# Patient Record
Sex: MALE | Race: AMERICAN INDIAN OR ALASKA NATIVE | NOT HISPANIC OR LATINO | Employment: FULL TIME | URBAN - METROPOLITAN AREA
[De-identification: names, ages, dates, MRNs, and addresses within clinical notes are randomized per-mention and may not be internally consistent; named-entity substitution may affect disease eponyms.]

---

## 2023-06-08 ENCOUNTER — HOSPITAL ENCOUNTER (EMERGENCY)
Facility: HOSPITAL | Age: 23
Discharge: HOME | End: 2023-06-09
Attending: EMERGENCY MEDICINE | Admitting: EMERGENCY MEDICINE
Payer: MEDICAID

## 2023-06-08 ENCOUNTER — APPOINTMENT (OUTPATIENT)
Dept: RADIOLOGY | Facility: HOSPITAL | Age: 23
End: 2023-06-08
Attending: EMERGENCY MEDICINE
Payer: MEDICAID

## 2023-06-08 DIAGNOSIS — S62.635A CLOSED DISPLACED FRACTURE OF DISTAL PHALANX OF LEFT RING FINGER, INITIAL ENCOUNTER: ICD-10-CM

## 2023-06-08 PROCEDURE — 99284 EMERGENCY DEPT VISIT MOD MDM: CPT | Mod: 25

## 2023-06-08 PROCEDURE — 73130 X-RAY EXAM OF HAND: CPT | Mod: LT

## 2023-06-08 PROCEDURE — 26750 TREAT FINGER FRACTURE EACH: CPT | Mod: F3

## 2023-06-08 ASSESSMENT — ACTIVITIES OF DAILY LIVING (ADL): ADLS_ACUITY_SCORE: 33

## 2023-06-09 ENCOUNTER — TELEPHONE (OUTPATIENT)
Dept: ORTHOPEDICS | Facility: CLINIC | Age: 23
End: 2023-06-09

## 2023-06-09 ENCOUNTER — PATIENT OUTREACH (OUTPATIENT)
Dept: CARE COORDINATION | Facility: CLINIC | Age: 23
End: 2023-06-09

## 2023-06-09 VITALS
OXYGEN SATURATION: 97 % | HEIGHT: 71 IN | TEMPERATURE: 99.8 F | SYSTOLIC BLOOD PRESSURE: 131 MMHG | RESPIRATION RATE: 16 BRPM | HEART RATE: 88 BPM | BODY MASS INDEX: 28 KG/M2 | WEIGHT: 200 LBS | DIASTOLIC BLOOD PRESSURE: 80 MMHG

## 2023-06-09 ASSESSMENT — ACTIVITIES OF DAILY LIVING (ADL): ADLS_ACUITY_SCORE: 35
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
## 2023-06-09 NOTE — TELEPHONE ENCOUNTER
I called the patient to offer them an appointment with Dr. Lin Tuesday 06/13/2023 at 8:10am or Dr. Daigle Wednesday 06/14/2023 at 9:00am (okay to double book). I left a message asking them to call us back to get scheduled.    Valeriano Garrett, EMT

## 2023-06-09 NOTE — PROGRESS NOTES
Clinic Care Coordination Contact    Follow Up Progress Note      Assessment: The pt was recently in the ED, I called to check up on the pt and help the pt setup a ED follow up. The pt was at [facility] for finger injury. I called and talked to the pt, it seems like pt wants to establish care, but will call to schedule later.     Care Gaps:    Health Maintenance Due   Topic Date Due     YEARLY PREVENTIVE VISIT  Never done     ADVANCE CARE PLANNING  Never done     HIV SCREENING  Never done     HEPATITIS C SCREENING  Never done     HPV IMMUNIZATION (2 - Male 3-dose series) 01/13/2020     COVID-19 Vaccine (3 - Pfizer series) 03/07/2022     PHQ-2 (once per calendar year)  Never done           Care Plans      Intervention/Education provided during outreach:               Plan:     Care Coordinator will follow up in

## 2023-06-09 NOTE — TELEPHONE ENCOUNTER
Orthopedic/Sports Medicine Fracture Triage    Incoming call/or message from cold call to back line.    Fracture type: Finger.    The patient is in a  splint.    Date of injury 06/09/2023.    Triaged by: Dr. Anderson.    Determined to be managed Surgically.    Needs to be seen ASAP.    Additional Comments/information: Dr. Anderson suggested that they be seen early next week.    Valeriano Garrett, EMT

## 2023-06-09 NOTE — TELEPHONE ENCOUNTER
Patient cold called direct clinic line stating that they needed to be seen for a finger fracture. I let them know that I would show their xrays to a doctor who will determine if they need to see a surgeon and we will call them back once we know how to schedule them.    Valeriano Garrett, EMT
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
## 2023-06-12 DIAGNOSIS — M79.645 FINGER PAIN, LEFT: Primary | ICD-10-CM

## 2023-06-13 ENCOUNTER — OFFICE VISIT (OUTPATIENT)
Dept: ORTHOPEDICS | Facility: CLINIC | Age: 23
End: 2023-06-13
Payer: MEDICAID

## 2023-06-13 ENCOUNTER — PRE VISIT (OUTPATIENT)
Dept: ORTHOPEDICS | Facility: CLINIC | Age: 23
End: 2023-06-13

## 2023-06-13 ENCOUNTER — ANCILLARY PROCEDURE (OUTPATIENT)
Dept: GENERAL RADIOLOGY | Facility: CLINIC | Age: 23
End: 2023-06-13
Attending: STUDENT IN AN ORGANIZED HEALTH CARE EDUCATION/TRAINING PROGRAM
Payer: MEDICAID

## 2023-06-13 DIAGNOSIS — M79.645 FINGER PAIN, LEFT: ICD-10-CM

## 2023-06-13 DIAGNOSIS — S62.635A CLOSED DISPLACED FRACTURE OF DISTAL PHALANX OF LEFT RING FINGER, INITIAL ENCOUNTER: ICD-10-CM

## 2023-06-13 PROCEDURE — 99204 OFFICE O/P NEW MOD 45 MIN: CPT | Performed by: STUDENT IN AN ORGANIZED HEALTH CARE EDUCATION/TRAINING PROGRAM

## 2023-06-13 PROCEDURE — 73140 X-RAY EXAM OF FINGER(S): CPT | Mod: LT | Performed by: RADIOLOGY

## 2023-06-13 ASSESSMENT — ENCOUNTER SYMPTOMS
MYALGIAS: 1
ARTHRALGIAS: 1
NECK PAIN: 0
MUSCLE CRAMPS: 0
BACK PAIN: 0
MUSCLE WEAKNESS: 0
STIFFNESS: 0
JOINT SWELLING: 1
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
## 2023-06-13 NOTE — NURSING NOTE
Reason For Visit:   Chief Complaint   Patient presents with     Consult     closed displaced fracture of dsital phalanx of L ring finger/Dr. Meléndez       Primary MD: Arianna Durbin  Ref. MD: Dr. Meléndez     Age: 22 year old    ?  No      There were no vitals taken for this visit.      Pain Assessment  Patient Currently in Pain: Yes  Patient's Stated Pain Goal: 4    Hand Dominance Evaluation  Hand Dominance: Ambidextrous          QuickDASH Assessment       View : No data to display.                   No current outpatient medications on file.       No Known Allergies    Ml Chakraborty
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
## 2023-07-06 DIAGNOSIS — M79.645 FINGER PAIN, LEFT: Primary | ICD-10-CM

## 2023-07-11 ENCOUNTER — OFFICE VISIT (OUTPATIENT)
Dept: ORTHOPEDICS | Facility: CLINIC | Age: 23
End: 2023-07-11
Payer: COMMERCIAL

## 2023-07-11 ENCOUNTER — ANCILLARY PROCEDURE (OUTPATIENT)
Dept: GENERAL RADIOLOGY | Facility: CLINIC | Age: 23
End: 2023-07-11
Attending: STUDENT IN AN ORGANIZED HEALTH CARE EDUCATION/TRAINING PROGRAM
Payer: COMMERCIAL

## 2023-07-11 DIAGNOSIS — M79.645 FINGER PAIN, LEFT: ICD-10-CM

## 2023-07-11 DIAGNOSIS — S62.635A CLOSED DISPLACED FRACTURE OF DISTAL PHALANX OF LEFT RING FINGER, INITIAL ENCOUNTER: Primary | ICD-10-CM

## 2023-07-11 PROCEDURE — 99213 OFFICE O/P EST LOW 20 MIN: CPT | Performed by: STUDENT IN AN ORGANIZED HEALTH CARE EDUCATION/TRAINING PROGRAM

## 2023-07-11 PROCEDURE — 73140 X-RAY EXAM OF FINGER(S): CPT | Mod: LT | Performed by: RADIOLOGY

## 2023-07-11 RX ORDER — BUPROPION HYDROCHLORIDE 150 MG/1
TABLET ORAL
COMMUNITY
Start: 2021-12-15

## 2023-07-11 RX ORDER — TRAZODONE HYDROCHLORIDE 50 MG/1
TABLET, FILM COATED ORAL
COMMUNITY
Start: 2023-04-04

## 2023-07-11 RX ORDER — GABAPENTIN 300 MG/1
CAPSULE ORAL
COMMUNITY
Start: 2023-03-07
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
## 2023-07-11 NOTE — PROGRESS NOTES
Ortho Hand    Doing well.  Wearing splint most of the time, has taken it off and tried to bend the joint.    On exam, mild tenderness over the left ring finger distal phalanx.    XR: Stable alignment of left ring finger mallet fracture    A/P: 22-year-old male ambidextrous smoker presenting with left ring finger DIP closed mallet fracture, now 4 weeks after initiation of mallet splinting    -Since patient has some tenderness and is only at 4 weeks after initiation of nonoperative management, my recommendation is an additional 2 weeks of splinting at all times.  Explained and reiterated the importance of not bending the DIP joint and to wear the splint at all times.  When removing for showering or handwashing, patient should keep the DIP joint extended.  Encouraged patient to quit smoking.  Turn to clinic in 2 weeks for reevaluation with XR.  -A total of 20 minutes was devoted to review of chart, direct face-to-face patient counseling and documentation during this encounter, exclusive of any procedure performed.    Devonte Lin MD, PhD
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
## 2023-07-11 NOTE — NURSING NOTE
Reason For Visit:   Chief Complaint   Patient presents with     RECHECK     4 wk followup left ring finger fracture DOI 6/8/23       Primary MD: Mayra Nguyen      Age: 22 year old    Right hand dominant    ?  No      There were no vitals taken for this visit.      Pain Assessment  Patient Currently in Pain: Yes  0-10 Pain Scale: 3  Primary Pain Location: Finger (Comment which one) (left ring)               QuickDASH Assessment      7/11/2023     9:01 AM   QuickDASH Main   1. Open a tight or new jar Severe difficulty   2. Do heavy household chores (e.g., wash walls, floors) Mild difficulty   3. Carry a shopping bag or briefcase No difficulty   4. Wash your back No difficulty   5. Use a knife to cut food Mild difficulty   6. Recreational activities in which you take some force or impact through your arm, shoulder or hand (e.g., golf, hammering, tennis, etc.) Moderate difficulty   7. During the past week, to what extent has your arm, shoulder or hand problem interfered with your normal social activities with family, friends, neighbours or groups Slightly   8. During the past week, were you limited in your work or other regular daily activities as a result of your arm, shoulder or hand problem Slightly limited   9. Arm, shoulder or hand pain Moderate   10.Tingling (pins and needles) in your arm,shoulder or hand None   11. During the past week, how much difficulty have you had sleeping because of the pain in your arm, shoulder or hand No difficulty   Quickdash Ability Score 25          Current Outpatient Medications   Medication Sig Dispense Refill     buPROPion (WELLBUTRIN XL) 150 MG 24 hr tablet        gabapentin (NEURONTIN) 300 MG capsule        traZODone (DESYREL) 50 MG tablet          No Known Allergies    Miles Ariana, ATC

## 2023-07-14 DIAGNOSIS — M79.645 FINGER PAIN, LEFT: Primary | ICD-10-CM

## 2023-12-14 ENCOUNTER — HOSPITAL ENCOUNTER (EMERGENCY)
Facility: HOSPITAL | Age: 23
Discharge: HOME | End: 2023-12-15
Attending: EMERGENCY MEDICINE | Admitting: EMERGENCY MEDICINE
Payer: MEDICAID

## 2023-12-14 VITALS
TEMPERATURE: 98.8 F | SYSTOLIC BLOOD PRESSURE: 138 MMHG | RESPIRATION RATE: 16 BRPM | DIASTOLIC BLOOD PRESSURE: 93 MMHG | HEART RATE: 69 BPM | OXYGEN SATURATION: 100 %

## 2023-12-14 DIAGNOSIS — H10.31 ACUTE CONJUNCTIVITIS OF RIGHT EYE, UNSPECIFIED ACUTE CONJUNCTIVITIS TYPE: ICD-10-CM

## 2023-12-14 PROCEDURE — 99283 EMERGENCY DEPT VISIT LOW MDM: CPT

## 2023-12-15 ENCOUNTER — PATIENT OUTREACH (OUTPATIENT)
Dept: CARE COORDINATION | Facility: CLINIC | Age: 23
End: 2023-12-15
Payer: MEDICAID

## 2023-12-15 PROBLEM — F32.A DEPRESSION: Status: ACTIVE | Noted: 2021-12-15

## 2023-12-15 PROBLEM — F15.11 METHAMPHETAMINE USE DISORDER, MILD, IN EARLY REMISSION (H): Status: ACTIVE | Noted: 2021-12-15

## 2023-12-15 RX ORDER — POLYMYXIN B SULFATE AND TRIMETHOPRIM 1; 10000 MG/ML; [USP'U]/ML
2 SOLUTION OPHTHALMIC EVERY 6 HOURS
Qty: 10 ML | Refills: 0 | Status: SHIPPED | OUTPATIENT
Start: 2023-12-15 | End: 2023-12-22

## 2023-12-15 NOTE — PROGRESS NOTES
Clinic Care Coordination Contact  Follow Up Progress Note      Assessment:  The pt was recently in the ED, I called to check up on the pt and help the pt setup a ED follow up. The pt was at [facility] for eye drainage. I called the pt, but got his vm, so I left a vm for the pt to give me a call back.     Care Gaps:    Health Maintenance Due   Topic Date Due    YEARLY PREVENTIVE VISIT  Never done    ADVANCE CARE PLANNING  Never done    HIV SCREENING  Never done    HEPATITIS C SCREENING  Never done    HPV IMMUNIZATION (2 - Male 3-dose series) 01/13/2020    INFLUENZA VACCINE (1) 09/01/2023    COVID-19 Vaccine (3 - 2023-24 season) 09/01/2023           Care Plans      Intervention/Education provided during outreach:               Plan:     Care Coordinator will follow up in

## 2023-12-15 NOTE — ED TRIAGE NOTES
Pt states right eye redness and drainage for the past 2 days.  Pt states pain is getting progressively worse.

## 2023-12-15 NOTE — ED PROVIDER NOTES
EMERGENCY DEPARTMENT ENCOUNTER      NAME: Timothy Schaffer  AGE: 23 year old male  YOB: 2000  MRN: [id number]  EVALUATION DATE & TIME: No admission date for patient encounter.    PCP: Mayra Nguyen    ED PROVIDER: Jus Reynaga M.D.      Chief Complaint   Patient presents with    Eye Drainage         FINAL IMPRESSION:  1. Acute conjunctivitis of right eye, unspecified acute conjunctivitis type          ED COURSE & MEDICAL DECISION MAKING:    Pertinent Labs & Imaging studies reviewed below.  All EKGs below represent my independent interpretation.      Patient is a otherwise healthy 23-year-old who presents with few days of right eye redness, mild discomfort.  No initial injury.  On exam here he is no pain with extraocular motion, and I do not suspect he has corneal abrasion, but is gross examination is consistent with conjunctivitis.  Given progression of symptoms for the last few days a think we should treat for possible bacterial origin and he was sent home with Polytrim drops.  We discussed return precautions.    Additional ED Course Timestamps:  1:05 AM I met with the patient, obtained history, performed an initial exam, and discussed options and plan for diagnostics and treatment here in the ED.     Medical Decision Making    History:  Supplemental history from: Documented in chart, if applicable  External Record(s) reviewed: Documented in chart, if applicable.    Work Up:  Chart documentation includes differential considered and any EKGs or imaging independently interpreted by provider, where specified.  In additional to work up documented, I considered the following work up: Documented in chart, if applicable.    External consultation:  Discussion of management with another provider: Documented in chart, if applicable    Complicating factors:  Care impacted by chronic illness: N/A  Care affected by social determinants of health: N/A    Disposition considerations: Discharge. No recommendations  "on prescription strength medication(s). N/A.    At the conclusion of the encounter I discussed the results of all of the tests and the disposition. The questions were answered. The patient or family acknowledged understanding and was agreeable with the care plan.       MEDICATIONS GIVEN IN THE EMERGENCY:  Medications - No data to display      NEW PRESCRIPTIONS STARTED AT TODAY'S ER VISIT  Discharge Medication List as of 12/15/2023  1:04 AM        START taking these medications    Details   polymixin b-trimethoprim (POLYTRIM) 10728-2.1 UNIT/ML-% ophthalmic solution Place 2 drops into the right eye every 6 hours for 7 days, Disp-10 mL, R-0, E-Prescribe                =================================================================    HPI    Patient information was obtained from: patient     Use of : N/A         Timothy Schaffer is a 23 year old male with a pertinent history of depression, alcohol use, and methamphetamine use who presents to this ED for evaluation of eye drainage.    The patient presents with a few days of a redness, itchiness, and draining in his right eye. It currently feels \"scratchy\" and more blurry than his left eye. He does not wear contacts or glasses. He says his girlfriends daughter may have conjunctivitis. He denies any pain in his eye. Denies any other complaints at this time.          VITALS:  BP (!) 138/93   Pulse 69   Temp 98.8  F (37.1  C) (Temporal)   Resp 16   SpO2 100%     PHYSICAL EXAM    .og      PROCEDURES:   None      I, Jonny Yang am serving as a scribe to document services personally performed by Dr. Jus Reynaga based on my observation and the provider's statements to me. IJus MD attest that Jonny Yang is acting in a scribe capacity, has observed my performance of the services and has documented them in accordance with my direction.    Jus Reynaga M.D.  Emergency Medicine  [facility]" [facility] EMERGENCY DEPARTMENT  [address] [place] [address]  [phone]  Dept: [phone]       Jus Reynaga MD  12/15/23 0548

## 2023-12-15 NOTE — DISCHARGE INSTRUCTIONS
Please practice good hand hygiene, conjunctivitis can be very contagious.    If your pain, drainage, is getting worse on Saturday and Sunday: this is a sign that your infection is not being adequately treated and I recommend you check in at an urgent care or back to the emergency department for reevaluation.    Otherwise please complete the drops.

## 2023-12-18 ENCOUNTER — PATIENT OUTREACH (OUTPATIENT)
Dept: CARE COORDINATION | Facility: CLINIC | Age: 23
End: 2023-12-18
Payer: MEDICAID

## 2023-12-18 NOTE — PROGRESS NOTES
Clinic Care Coordination Contact  Follow Up Progress Note      Assessment:  The pt was recently in the ED, I called to check up on the pt and help the pt setup a ED follow up. The pt was at [facility] for eye drainage. I called the pt, but got his vm, so I left a vm for the pt to give me a call back.      Care Gaps:    Health Maintenance Due   Topic Date Due    YEARLY PREVENTIVE VISIT  Never done    ADVANCE CARE PLANNING  Never done    HIV SCREENING  Never done    HEPATITIS C SCREENING  Never done    HPV IMMUNIZATION (2 - Male 3-dose series) 01/13/2020    INFLUENZA VACCINE (1) 09/01/2023    COVID-19 Vaccine (3 - 2023-24 season) 09/01/2023           Care Plans    Intervention/Education provided during outreach:               Plan:   Care Coordinator will follow up in

## 2023-12-19 ENCOUNTER — PATIENT OUTREACH (OUTPATIENT)
Dept: CARE COORDINATION | Facility: CLINIC | Age: 23
End: 2023-12-19
Payer: MEDICAID

## 2023-12-19 NOTE — PROGRESS NOTES
Clinic Care Coordination Contact  Follow Up Progress Note      Assessment: The pt was recently in the ED, I called to check up on the pt and help the pt setup a ED follow up. The pt was at [facility] for eye drainage. I called the pt, but got his vm, so I left a vm for the pt to give me a call back.       Care Gaps:    Health Maintenance Due   Topic Date Due    YEARLY PREVENTIVE VISIT  Never done    ADVANCE CARE PLANNING  Never done    HIV SCREENING  Never done    HEPATITIS C SCREENING  Never done    HPV IMMUNIZATION (2 - Male 3-dose series) 01/13/2020    INFLUENZA VACCINE (1) 09/01/2023    COVID-19 Vaccine (3 - 2023-24 season) 09/01/2023           Care Plans      Intervention/Education provided during outreach:             Plan:     Care Coordinator will follow up in